# Patient Record
Sex: MALE | Race: WHITE | NOT HISPANIC OR LATINO | Employment: OTHER | URBAN - METROPOLITAN AREA
[De-identification: names, ages, dates, MRNs, and addresses within clinical notes are randomized per-mention and may not be internally consistent; named-entity substitution may affect disease eponyms.]

---

## 2017-01-16 ENCOUNTER — TREATMENT (OUTPATIENT)
Dept: PHYSICAL THERAPY | Facility: CLINIC | Age: 72
End: 2017-01-16

## 2017-01-16 DIAGNOSIS — M54.41 ACUTE RIGHT-SIDED LOW BACK PAIN WITH RIGHT-SIDED SCIATICA: Primary | ICD-10-CM

## 2017-01-16 PROCEDURE — 97012 MECHANICAL TRACTION THERAPY: CPT | Performed by: PHYSICAL THERAPIST

## 2017-01-16 NOTE — PROGRESS NOTES
Daily Progress Note    Time In: 11:00     Time Out: 11:27    Subjective   Did really good on the drive to [place] - stopped every 2 hours - played golf twice without any increase in s/s        Objective   General  Observation: no tenderness right low back     AROM:                                    , , , ,   , ,       , , , ,   , , ,      , ,Lumbar Spine Range of Motion  Lumbar Spine Flexion: 100  Lumbar Spine Extension: 100  Lumbar Spine Sidebend Left: 100  Lumbar Spine Sidebend Right: 100  Lumbar Spine Rotation Left: 100  Lumbar Spine Rotation Right: 100,   , , , ,   , ,      , , ,   , , , ,        , , ,   , , , ,   ,      , , , ,   ,      , , , ,        , , , ,         PROCEDURES AND MODALITIES:                                Traction 25820  Traction Type: Lumbar  Rx Minutes: 15  Position: Hook-lying  Weight: 100  Hold: 45  Relax: 15  Progression: 1  Regression: 1    EXERCISE                  HEP reviewed                                          MANUAL PT:                               Total Treatment Time: 20 Minutes    Assessment/Plan   Improving subjective reports, ROM and tenderness. Attempted traction only today - patient to continue with HEP - reviewed progressions. 3rd epidural scheduled for [facility]. Will call when returns from [place] in March if further PT warranted.   Progress strengthening /stabilization /functional activity             Dianna Joiner, PT  Physical Therapist

## 2017-01-17 ENCOUNTER — HOSPITAL ENCOUNTER (OUTPATIENT)
Dept: PAIN MEDICINE | Facility: HOSPITAL | Age: 72
Discharge: HOME | End: 2017-01-17

## 2017-05-01 ENCOUNTER — OFFICE VISIT (OUTPATIENT)
Dept: CARDIOLOGY | Facility: CLINIC | Age: 72
End: 2017-05-01

## 2017-05-01 VITALS
SYSTOLIC BLOOD PRESSURE: 138 MMHG | DIASTOLIC BLOOD PRESSURE: 74 MMHG | BODY MASS INDEX: 26.43 KG/M2 | HEART RATE: 76 BPM | WEIGHT: 184.6 LBS | HEIGHT: 70 IN

## 2017-05-01 DIAGNOSIS — I25.84 CORONARY ARTERY CALCIFICATION: ICD-10-CM

## 2017-05-01 DIAGNOSIS — R53.83 FATIGUE, UNSPECIFIED TYPE: ICD-10-CM

## 2017-05-01 DIAGNOSIS — R06.09 DYSPNEA ON EXERTION: Primary | ICD-10-CM

## 2017-05-01 DIAGNOSIS — I71.40 ABDOMINAL AORTIC ANEURYSM (AAA) WITHOUT RUPTURE (HCC): ICD-10-CM

## 2017-05-01 DIAGNOSIS — Z72.0 TOBACCO USE: ICD-10-CM

## 2017-05-01 DIAGNOSIS — I25.10 CORONARY ARTERY CALCIFICATION: ICD-10-CM

## 2017-05-01 PROCEDURE — 99214 OFFICE O/P EST MOD 30 MIN: CPT | Performed by: INTERNAL MEDICINE

## 2017-05-01 PROCEDURE — 93000 ELECTROCARDIOGRAM COMPLETE: CPT | Performed by: INTERNAL MEDICINE

## 2017-05-10 ENCOUNTER — HOSPITAL ENCOUNTER (OUTPATIENT)
Dept: CARDIOLOGY | Facility: HOSPITAL | Age: 72
Discharge: HOME | End: 2017-05-10
Attending: INTERNAL MEDICINE | Admitting: INTERNAL MEDICINE

## 2017-05-10 ENCOUNTER — HOSPITAL ENCOUNTER (OUTPATIENT)
Dept: CARDIOLOGY | Facility: HOSPITAL | Age: 72
Discharge: HOME | End: 2017-05-10
Attending: INTERNAL MEDICINE

## 2017-05-10 VITALS
BODY MASS INDEX: 26.05 KG/M2 | SYSTOLIC BLOOD PRESSURE: 134 MMHG | DIASTOLIC BLOOD PRESSURE: 70 MMHG | HEIGHT: 70 IN | HEART RATE: 73 BPM | WEIGHT: 182 LBS

## 2017-05-10 DIAGNOSIS — R06.02 SHORTNESS OF BREATH: ICD-10-CM

## 2017-05-10 DIAGNOSIS — I25.10 CORONARY ARTERY CALCIFICATION: ICD-10-CM

## 2017-05-10 DIAGNOSIS — I71.40 ABDOMINAL AORTIC ANEURYSM (AAA) WITHOUT RUPTURE (HCC): ICD-10-CM

## 2017-05-10 DIAGNOSIS — I25.84 CORONARY ARTERY CALCIFICATION: ICD-10-CM

## 2017-05-10 DIAGNOSIS — R06.09 DYSPNEA ON EXERTION: ICD-10-CM

## 2017-05-10 DIAGNOSIS — I25.10 CORONARY ARTERY CALCIFICATION SEEN ON CAT SCAN: Primary | ICD-10-CM

## 2017-05-10 LAB
ABDOMINAL DIST AORTA AP: 2.6 CM
ABDOMINAL DIST AORTA TRANS: 2.6 CM
ABDOMINAL MID AORTA AP: 3.2 CM
ABDOMINAL MID AORTA TRANS: 3.4 CM
ABDOMINAL PROX AORTA AP: 1.6 CM
ABDOMINAL PROX AORTA TRANS: 1.9 CM
ASCENDING AORTA: 3.8 CM
BH CV ECHO MEAS - ACS: 2.1 CM
BH CV ECHO MEAS - AO MAX PG: 9.1 MMHG
BH CV ECHO MEAS - AO ROOT AREA (BSA CORRECTED): 2
BH CV ECHO MEAS - AO ROOT AREA: 12.5 CM^2
BH CV ECHO MEAS - AO ROOT DIAM: 4 CM
BH CV ECHO MEAS - AO V2 MAX: 150.7 CM/SEC
BH CV ECHO MEAS - BSA(HAYCOCK): 2 M^2
BH CV ECHO MEAS - BSA: 2 M^2
BH CV ECHO MEAS - BZI_BMI: 26.1 KILOGRAMS/M^2
BH CV ECHO MEAS - BZI_METRIC_HEIGHT: 177.8 CM
BH CV ECHO MEAS - BZI_METRIC_WEIGHT: 82.6 KG
BH CV ECHO MEAS - CONTRAST EF 4CH: 59.6 ML/M^2
BH CV ECHO MEAS - EDV(CUBED): 107.9 ML
BH CV ECHO MEAS - EDV(MOD-SP4): 47 ML
BH CV ECHO MEAS - EDV(TEICH): 105.5 ML
BH CV ECHO MEAS - EF(CUBED): 75.7 %
BH CV ECHO MEAS - EF(MOD-SP4): 59.6 %
BH CV ECHO MEAS - EF(TEICH): 67.6 %
BH CV ECHO MEAS - ESV(CUBED): 26.2 ML
BH CV ECHO MEAS - ESV(MOD-SP4): 19 ML
BH CV ECHO MEAS - ESV(TEICH): 34.2 ML
BH CV ECHO MEAS - FS: 37.6 %
BH CV ECHO MEAS - IVS/LVPW: 0.94
BH CV ECHO MEAS - IVSD: 1.1 CM
BH CV ECHO MEAS - LAT PEAK E' VEL: 10 CM/SEC
BH CV ECHO MEAS - LV DIASTOLIC VOL/BSA (35-75): 23.4 ML/M^2
BH CV ECHO MEAS - LV MASS(C)D: 211.5 GRAMS
BH CV ECHO MEAS - LV MASS(C)DI: 105.5 GRAMS/M^2
BH CV ECHO MEAS - LV SYSTOLIC VOL/BSA (12-30): 9.5 ML/M^2
BH CV ECHO MEAS - LVIDD: 4.8 CM
BH CV ECHO MEAS - LVIDS: 3 CM
BH CV ECHO MEAS - LVLD AP4: 6.8 CM
BH CV ECHO MEAS - LVLS AP4: 6.2 CM
BH CV ECHO MEAS - LVPWD: 1.2 CM
BH CV ECHO MEAS - MED PEAK E' VEL: 8 CM/SEC
BH CV ECHO MEAS - MV A DUR: 0.11 SEC
BH CV ECHO MEAS - MV A MAX VEL: 71.8 CM/SEC
BH CV ECHO MEAS - MV DEC SLOPE: 176.6 CM/SEC^2
BH CV ECHO MEAS - MV DEC TIME: 0.27 SEC
BH CV ECHO MEAS - MV E MAX VEL: 49 CM/SEC
BH CV ECHO MEAS - MV E/A: 0.68
BH CV ECHO MEAS - MV P1/2T MAX VEL: 49 CM/SEC
BH CV ECHO MEAS - MV P1/2T: 81.2 MSEC
BH CV ECHO MEAS - MVA P1/2T LCG: 4.5 CM^2
BH CV ECHO MEAS - MVA(P1/2T): 2.7 CM^2
BH CV ECHO MEAS - PULM A REVS DUR: 0.11 SEC
BH CV ECHO MEAS - PULM A REVS VEL: 29.7 CM/SEC
BH CV ECHO MEAS - PULM DIAS VEL: 39.4 CM/SEC
BH CV ECHO MEAS - PULM S/D: 1.3
BH CV ECHO MEAS - PULM SYS VEL: 50.5 CM/SEC
BH CV ECHO MEAS - SI(CUBED): 40.7 ML/M^2
BH CV ECHO MEAS - SI(MOD-SP4): 14 ML/M^2
BH CV ECHO MEAS - SI(TEICH): 35.6 ML/M^2
BH CV ECHO MEAS - SV(CUBED): 81.7 ML
BH CV ECHO MEAS - SV(MOD-SP4): 28 ML
BH CV ECHO MEAS - SV(TEICH): 71.3 ML
BH CV STRESS BP STAGE 1: NORMAL
BH CV STRESS BP STAGE 2: NORMAL
BH CV STRESS DURATION MIN STAGE 1: 3
BH CV STRESS DURATION MIN STAGE 2: 1
BH CV STRESS DURATION SEC STAGE 1: 0
BH CV STRESS DURATION SEC STAGE 2: 26
BH CV STRESS GRADE STAGE 1: 10
BH CV STRESS GRADE STAGE 2: 12
BH CV STRESS HR STAGE 1: 102
BH CV STRESS HR STAGE 2: 121
BH CV STRESS METS STAGE 1: 5
BH CV STRESS METS STAGE 2: 7.5
BH CV STRESS O2 STAGE 2: 87
BH CV STRESS PROTOCOL 1: NORMAL
BH CV STRESS SPEED STAGE 1: 1.7
BH CV STRESS SPEED STAGE 2: 2.5
BH CV STRESS STAGE 1: 1
BH CV STRESS STAGE 2: 2
BH CV VAS SMA 1ST PP TIME: 15 MIN
BH CV VAS SMA 2ND PP TIME: 30 MIN
BH CV VAS SMA 3RD PP TIME: 45 MIN
E/E' RATIO: 6
LEFT ATRIUM VOLUME INDEX: 17 ML/M2
MAXIMAL PREDICTED HEART RATE: 150 BPM
PERCENT MAX PREDICTED HR: 80.67 %
SINUS: 3.8 CM
STJ: 3.5 CM
STRESS BASELINE BP: NORMAL MMHG
STRESS BASELINE HR: 73 BPM
STRESS O2 SAT REST: 97 %
STRESS PERCENT HR: 95 %
STRESS POST ESTIMATED WORKLOAD: 6 METS
STRESS POST EXERCISE DUR MIN: 6 MIN
STRESS POST EXERCISE DUR SEC: 26 SEC
STRESS POST O2 SAT PEAK: 87 %
STRESS POST PEAK BP: NORMAL MMHG
STRESS POST PEAK HR: 121 BPM
STRESS TARGET HR: 128 BPM

## 2017-05-10 PROCEDURE — 93325 DOPPLER ECHO COLOR FLOW MAPG: CPT | Performed by: INTERNAL MEDICINE

## 2017-05-10 PROCEDURE — 93320 DOPPLER ECHO COMPLETE: CPT | Performed by: INTERNAL MEDICINE

## 2017-05-10 PROCEDURE — 93320 DOPPLER ECHO COMPLETE: CPT

## 2017-05-10 PROCEDURE — 93979 VASCULAR STUDY: CPT

## 2017-05-10 PROCEDURE — 93017 CV STRESS TEST TRACING ONLY: CPT

## 2017-05-10 PROCEDURE — 93352 ADMIN ECG CONTRAST AGENT: CPT | Performed by: INTERNAL MEDICINE

## 2017-05-10 PROCEDURE — 93016 CV STRESS TEST SUPVJ ONLY: CPT | Performed by: INTERNAL MEDICINE

## 2017-05-10 PROCEDURE — 93979 VASCULAR STUDY: CPT | Performed by: INTERNAL MEDICINE

## 2017-05-10 PROCEDURE — 93325 DOPPLER ECHO COLOR FLOW MAPG: CPT

## 2017-05-10 PROCEDURE — C8928 TTE W OR W/O FOL W/CON,STRES: HCPCS

## 2017-05-10 PROCEDURE — 93018 CV STRESS TEST I&R ONLY: CPT | Performed by: INTERNAL MEDICINE

## 2017-05-10 PROCEDURE — 25010000002 PERFLUTREN (DEFINITY) 8.476 MG IN SODIUM CHLORIDE 10 ML INJECTION: Performed by: INTERNAL MEDICINE

## 2017-05-10 PROCEDURE — 93350 STRESS TTE ONLY: CPT | Performed by: INTERNAL MEDICINE

## 2017-05-10 RX ADMIN — PERFLUTREN 3 ML: 6.52 INJECTION, SUSPENSION INTRAVENOUS at 13:43

## 2017-05-26 ENCOUNTER — HOSPITAL ENCOUNTER (OUTPATIENT)
Dept: CARDIOLOGY | Facility: HOSPITAL | Age: 72
Discharge: HOME | End: 2017-05-26
Attending: INTERNAL MEDICINE | Admitting: INTERNAL MEDICINE

## 2017-05-26 DIAGNOSIS — R06.02 SHORTNESS OF BREATH: ICD-10-CM

## 2017-05-26 DIAGNOSIS — I25.10 CORONARY ARTERY CALCIFICATION SEEN ON CAT SCAN: ICD-10-CM

## 2017-05-26 LAB
BH CV NUCLEAR PRIOR STUDY: 3
BH CV STRESS BP STAGE 1: NORMAL
BH CV STRESS COMMENTS STAGE 1: NORMAL
BH CV STRESS DOSE REGADENOSON STAGE 1: 0.4
BH CV STRESS DURATION MIN STAGE 1: 0
BH CV STRESS DURATION SEC STAGE 1: 15
BH CV STRESS HR STAGE 1: 117
BH CV STRESS PROTOCOL 1: NORMAL
BH CV STRESS RECOVERY BP: NORMAL MMHG
BH CV STRESS RECOVERY HR: 92 BPM
BH CV STRESS STAGE 1: 1
LV EF NUC BP: 61 %
MAXIMAL PREDICTED HEART RATE: 150 BPM
PERCENT MAX PREDICTED HR: 78 %
STRESS BASELINE BP: NORMAL MMHG
STRESS BASELINE HR: 75 BPM
STRESS PERCENT HR: 92 %
STRESS POST EXERCISE DUR SEC: 15 SEC
STRESS POST PEAK BP: NORMAL MMHG
STRESS POST PEAK HR: 117 BPM
STRESS TARGET HR: 128 BPM

## 2017-05-26 PROCEDURE — 25010000002 REGADENOSON 0.4 MG/5ML SOLUTION: Performed by: INTERNAL MEDICINE

## 2017-05-26 PROCEDURE — 93017 CV STRESS TEST TRACING ONLY: CPT

## 2017-05-26 PROCEDURE — 93018 CV STRESS TEST I&R ONLY: CPT | Performed by: INTERNAL MEDICINE

## 2017-05-26 PROCEDURE — 78452 HT MUSCLE IMAGE SPECT MULT: CPT

## 2017-05-26 PROCEDURE — A9502 TC99M TETROFOSMIN: HCPCS | Performed by: INTERNAL MEDICINE

## 2017-05-26 PROCEDURE — 78452 HT MUSCLE IMAGE SPECT MULT: CPT | Performed by: INTERNAL MEDICINE

## 2017-05-26 PROCEDURE — 0 TECHNETIUM TETROFOSMIN KIT: Performed by: INTERNAL MEDICINE

## 2017-05-26 PROCEDURE — 93016 CV STRESS TEST SUPVJ ONLY: CPT | Performed by: INTERNAL MEDICINE

## 2017-05-26 RX ADMIN — REGADENOSON 0.4 MG: 0.08 INJECTION, SOLUTION INTRAVENOUS at 12:25

## 2017-05-26 RX ADMIN — TETROFOSMIN 1 DOSE: 1.38 INJECTION, POWDER, LYOPHILIZED, FOR SOLUTION INTRAVENOUS at 12:25

## 2017-05-26 RX ADMIN — TETROFOSMIN 1 DOSE: 1.38 INJECTION, POWDER, LYOPHILIZED, FOR SOLUTION INTRAVENOUS at 11:35

## 2017-08-14 ENCOUNTER — TREATMENT (OUTPATIENT)
Dept: PHYSICAL THERAPY | Facility: CLINIC | Age: 72
End: 2017-08-14

## 2017-08-14 DIAGNOSIS — M54.41 ACUTE RIGHT-SIDED LOW BACK PAIN WITH RIGHT-SIDED SCIATICA: Primary | ICD-10-CM

## 2017-08-14 PROCEDURE — DRYNDL PR CUSTOM DRY NEEDLING SELF PAY: Performed by: PHYSICAL THERAPIST

## 2017-08-14 NOTE — PROGRESS NOTES
Physical Therapy Daily Progress Note      Visit # : 19  Raoul Moura reports: Played golf a week ago Monday and started having back pain again - was unable to play golf Friday - better today and yesterday.     Subjective     Objective     Palpation   Left   Tenderness of the lumbar interspinals and lumbar paraspinals.     Right   Hypertonic in the lumbar interspinals, lumbar paraspinals and quadratus lumborum.     Tenderness     Right Hip   Tenderness in the PSIS.      See Exercise, Manual, and Modality Logs for complete treatment.       Assessment & Plan     Assessment  Assessment details: Patient presents with flare-up in low back s/s after golfing one week ago. Increased muscle tone right PVM's, decreased trunk mobility, and decreased tolerance to prolonged positioning. Attempted lumbar traction and Dry Needling today. Patient is still compliant with HEP and will continue to be. Patient leaving town and will call to schedule if further treatment is indicated.         Progress per Plan of Care           Manual Therapy:         mins  40190;  Therapeutic Exercise:         mins  26762;     Neuromuscular Rosanne:        mins  25469;    Therapeutic Activity:          mins  63324;     Gait Training:           mins  82221;     Ultrasound:      8    mins  79629;    Electrical Stimulation:         mins  97465 ( );  Dry Needling     15     mins self-pay    Timed Treatment:   8   mins   Total Treatment:     35   mins    Dianna Joiner PT  Physical Therapist  [place] License # [id number]

## 2017-10-02 ENCOUNTER — TREATMENT (OUTPATIENT)
Dept: PHYSICAL THERAPY | Facility: CLINIC | Age: 72
End: 2017-10-02

## 2017-10-02 DIAGNOSIS — M54.41 CHRONIC RIGHT-SIDED LOW BACK PAIN WITH RIGHT-SIDED SCIATICA: Primary | ICD-10-CM

## 2017-10-02 DIAGNOSIS — G89.29 CHRONIC RIGHT-SIDED LOW BACK PAIN WITH RIGHT-SIDED SCIATICA: Primary | ICD-10-CM

## 2017-10-02 PROCEDURE — DRYNDL PR CUSTOM DRY NEEDLING SELF PAY: Performed by: PHYSICAL THERAPIST

## 2017-10-02 PROCEDURE — 97012 MECHANICAL TRACTION THERAPY: CPT | Performed by: PHYSICAL THERAPIST

## 2017-10-02 NOTE — PROGRESS NOTES
Physical Therapy Daily Progress Note        Visit # : 20  Raoul Moura reports: Was doing the hip abd/add machine at the gym really pulled the inside of my thigh then yesterday went to go play golf and had back and leg pain with every step     Subjective     Objective     Palpation   Left   Hypertonic in the iliopsoas and lumbar paraspinals.   Tenderness of the erector spinae and quadratus lumborum.     Right   Hypertonic in the iliopsoas and lumbar paraspinals. Tenderness of the erector spinae and quadratus lumborum.      See Exercise, Manual, and Modality Logs for complete treatment.       Assessment & Plan     Assessment  Assessment details: Patient presents with flare-up in low back s/s after golfing and performing hip machine at gym. Increased muscle tone right PVM's, decreased trunk mobility, and decreased tolerance to prolonged positioning. Attempted lumbar traction and Dry Needling today. Patient is still compliant with HEP and will continue to be. Patient leaving town and will call to schedule if further treatment is indicated.         Progress strengthening /stabilization /functional activity           Manual Therapy:         mins  38155;  Therapeutic Exercise:         mins  20122;     Neuromuscular Rosanne:        mins  62713;    Therapeutic Activity:         mins  27497;     Gait Training:           mins  24282;     Ultrasound:          mins  67925;    Electrical Stimulation:         mins  03331 ( );  Dry Needling     15     mins self-pay       Total Treatment:     28   mins    Dianna Batres PT  Physical Therapist  [place] License # [id number]

## 2017-10-04 ENCOUNTER — TREATMENT (OUTPATIENT)
Dept: PHYSICAL THERAPY | Facility: CLINIC | Age: 72
End: 2017-10-04

## 2017-10-04 DIAGNOSIS — M54.41 CHRONIC RIGHT-SIDED LOW BACK PAIN WITH RIGHT-SIDED SCIATICA: Primary | ICD-10-CM

## 2017-10-04 DIAGNOSIS — G89.29 CHRONIC RIGHT-SIDED LOW BACK PAIN WITH RIGHT-SIDED SCIATICA: Primary | ICD-10-CM

## 2017-10-04 PROCEDURE — 97012 MECHANICAL TRACTION THERAPY: CPT | Performed by: PHYSICAL THERAPIST

## 2017-10-04 PROCEDURE — 97035 APP MDLTY 1+ULTRASOUND EA 15: CPT | Performed by: PHYSICAL THERAPIST

## 2017-10-04 PROCEDURE — DRYNDL PR CUSTOM DRY NEEDLING SELF PAY: Performed by: PHYSICAL THERAPIST

## 2017-10-04 NOTE — PROGRESS NOTES
Physical Therapy Daily Progress Note        Visit # : 21  Raoul Moura reports: I am still having pain when I put weight through my right leg - I cancelled playing golf this weekend    Subjective     Objective     Palpation     Right   Hypertonic in the iliopsoas, lumbar interspinals, lumbar paraspinals and quadratus lumborum.     Tenderness     Right Hip   Tenderness in the PSIS.     Ambulation     Observational Gait   Gait: antalgic   Decreased walking speed, stride length and right stance time.      See Exercise, Manual, and Modality Logs for complete treatment.       Assessment & Plan     Assessment  Assessment details: Patient presents with antalgic gait, tender right PSIS and lumbar PVM's. Attempted Dry Needling followed by US for inflammation and traction for reduction of radicular s/s. Instructed patient to continue lumbar extension activity as tolerated.         Progress per Plan of Care           Manual Therapy:         mins  03604;  Therapeutic Exercise:         mins  51924;     Neuromuscular Rosanne:        mins  11648;    Therapeutic Activity:          mins  35565;     Gait Training:           mins  90128;     Ultrasound:     8     mins  11608;    Electrical Stimulation:         mins  65678 ( );  Dry Needling     15     mins self-pay    Timed Treatment:   8   mins   Total Treatment:     35   mins    Dianna Batres, PT  Physical Therapist  [place] License # [id number]

## 2017-10-09 ENCOUNTER — TREATMENT (OUTPATIENT)
Dept: PHYSICAL THERAPY | Facility: CLINIC | Age: 72
End: 2017-10-09

## 2017-10-09 DIAGNOSIS — G89.29 CHRONIC RIGHT-SIDED LOW BACK PAIN WITH RIGHT-SIDED SCIATICA: Primary | ICD-10-CM

## 2017-10-09 DIAGNOSIS — M54.41 CHRONIC RIGHT-SIDED LOW BACK PAIN WITH RIGHT-SIDED SCIATICA: Primary | ICD-10-CM

## 2017-10-09 PROCEDURE — DRYNDL PR CUSTOM DRY NEEDLING SELF PAY: Performed by: PHYSICAL THERAPIST

## 2017-10-09 PROCEDURE — 97012 MECHANICAL TRACTION THERAPY: CPT | Performed by: PHYSICAL THERAPIST

## 2017-10-09 PROCEDURE — 97035 APP MDLTY 1+ULTRASOUND EA 15: CPT | Performed by: PHYSICAL THERAPIST

## 2017-10-09 NOTE — PROGRESS NOTES
Physical Therapy Daily Progress Note        Visit # : 22  Raoul Moura reports: I had some discomfort Friday morning went and rode the golf cart and had increased pain Friday night - pain with every step I took    Subjective     Objective     Palpation     Right   Hypertonic in the erector spinae, lumbar interspinals, lumbar paraspinals and quadratus lumborum.      See Exercise, Manual, and Modality Logs for complete treatment.       Assessment & Plan     Assessment  Assessment details: Increased subjective reports antalgic gait, lumbar shift left, increased muscle tone right PSIS and lumbar PVM's. Attempted Dry Needling followed by US for inflammation and traction for reduction of radicular s/s. Instructed patient to continue lumbar extension activity as tolerated.         Progress strengthening /stabilization /functional activity           Manual Therapy:         mins  67493;  Therapeutic Exercise:         mins  61456;     Neuromuscular Rosanne:        mins  36930;    Therapeutic Activity:          mins  85039;     Gait Training:           mins  65691;     Ultrasound:     8     mins  92906;    Electrical Stimulation:         mins  11997 ( );  Dry Needling     15     mins self-pay    Timed Treatment:   8   mins   Total Treatment:     40   mins    Dianna Batres PT  Physical Therapist  [place] License # [id number]

## 2017-10-11 ENCOUNTER — TREATMENT (OUTPATIENT)
Dept: PHYSICAL THERAPY | Facility: CLINIC | Age: 72
End: 2017-10-11

## 2017-10-11 DIAGNOSIS — G89.29 CHRONIC RIGHT-SIDED LOW BACK PAIN WITH RIGHT-SIDED SCIATICA: Primary | ICD-10-CM

## 2017-10-11 DIAGNOSIS — M54.41 CHRONIC RIGHT-SIDED LOW BACK PAIN WITH RIGHT-SIDED SCIATICA: Primary | ICD-10-CM

## 2017-10-11 PROCEDURE — 97035 APP MDLTY 1+ULTRASOUND EA 15: CPT | Performed by: PHYSICAL THERAPIST

## 2017-10-11 PROCEDURE — DRYNDL PR CUSTOM DRY NEEDLING SELF PAY: Performed by: PHYSICAL THERAPIST

## 2017-10-11 PROCEDURE — 97012 MECHANICAL TRACTION THERAPY: CPT | Performed by: PHYSICAL THERAPIST

## 2017-10-11 NOTE — PROGRESS NOTES
Physical Therapy Daily Progress Note        Visit # : 23  Raoul Moura reports: I went to the chiropractor yesterday and he said I was really out of alignment     Subjective     Objective       Palpation     Right   Hypertonic in the erector spinae, iliopsoas, lumbar interspinals, lumbar paraspinals and quadratus lumborum.      See Exercise, Manual, and Modality Logs for complete treatment.       Assessment & Plan     Assessment  Assessment details: Minimal improvement noted in gait mechanics- decreased right LE stance time persists. Increased subjective reports antalgic gait, lumbar shift left, increased muscle tone right PSIS and lumbar PVM's. Attempted Dry Needling followed by US for inflammation and traction for reduction of radicular s/s. Instructed patient to continue lumbar extension activity as tolerated.         Progress strengthening /stabilization /functional activity           Manual Therapy:         mins  14094;  Therapeutic Exercise:         mins  22571;     Neuromuscular Rosanne:        mins  82033;    Therapeutic Activity:          mins  95623;     Gait Training:           mins  64447;     Ultrasound:     8     mins  44366;    Electrical Stimulation:         mins  45198 ( );  Dry Needling     15     mins self-pay    Timed Treatment:   8   mins   Total Treatment:     40   mins    Dianna Batres PT  Physical Therapist  [place] License # [id number]

## 2017-10-16 ENCOUNTER — TREATMENT (OUTPATIENT)
Dept: PHYSICAL THERAPY | Facility: CLINIC | Age: 72
End: 2017-10-16

## 2017-10-16 DIAGNOSIS — M54.41 CHRONIC RIGHT-SIDED LOW BACK PAIN WITH RIGHT-SIDED SCIATICA: Primary | ICD-10-CM

## 2017-10-16 DIAGNOSIS — G89.29 CHRONIC RIGHT-SIDED LOW BACK PAIN WITH RIGHT-SIDED SCIATICA: Primary | ICD-10-CM

## 2017-10-16 PROCEDURE — 97035 APP MDLTY 1+ULTRASOUND EA 15: CPT | Performed by: PHYSICAL THERAPIST

## 2017-10-16 PROCEDURE — DRYNDL PR CUSTOM DRY NEEDLING SELF PAY: Performed by: PHYSICAL THERAPIST

## 2017-10-16 PROCEDURE — 97012 MECHANICAL TRACTION THERAPY: CPT | Performed by: PHYSICAL THERAPIST

## 2017-10-16 NOTE — PROGRESS NOTES
Physical Therapy Daily Progress Note        Visit # : 24  Raoul Moura reports: I called to get an Epidural - the pain is better but not 100% yet    Subjective     Objective       Palpation   Left   Hypertonic in the erector spinae, lumbar interspinals, lumbar paraspinals and quadratus lumborum.     Right   Hypertonic in the erector spinae, lumbar interspinals, lumbar paraspinals and quadratus lumborum.     Ambulation     Observational Gait   Gait: antalgic   Decreased right stance time and right step length.      See Exercise, Manual, and Modality Logs for complete treatment.       Assessment & Plan     Assessment  Assessment details: Some improvement noted in gait mechanics- decreased right LE stance time persists however stride length is improved.  Lumbar shift left, increased muscle tone right PSIS and lumbar PVM's persists. Attempted Dry Needling followed by US for inflammation and traction for reduction of radicular s/s. Instructed patient to continue lumbar extension activity as tolerated.         Progress strengthening /stabilization /functional activity           Manual Therapy:         mins  13320;  Therapeutic Exercise:         mins  86382;     Neuromuscular Rosanne:        mins  19532;    Therapeutic Activity:          mins  02616;     Gait Training:           mins  96249;     Ultrasound:     8     mins  61621;    Electrical Stimulation:         mins  43898 ( );  Dry Needling     15     mins self-pay    Timed Treatment:   8   mins   Total Treatment:     40   mins    Dianna Batres PT  Physical Therapist  [place] License # [id number]

## 2017-10-19 ENCOUNTER — TREATMENT (OUTPATIENT)
Dept: PHYSICAL THERAPY | Facility: CLINIC | Age: 72
End: 2017-10-19

## 2017-10-19 DIAGNOSIS — G89.29 CHRONIC RIGHT-SIDED LOW BACK PAIN WITH RIGHT-SIDED SCIATICA: Primary | ICD-10-CM

## 2017-10-19 DIAGNOSIS — M54.41 CHRONIC RIGHT-SIDED LOW BACK PAIN WITH RIGHT-SIDED SCIATICA: Primary | ICD-10-CM

## 2017-10-19 PROCEDURE — DRYNDL PR CUSTOM DRY NEEDLING SELF PAY: Performed by: PHYSICAL THERAPIST

## 2017-10-19 PROCEDURE — 97012 MECHANICAL TRACTION THERAPY: CPT | Performed by: PHYSICAL THERAPIST

## 2017-10-19 PROCEDURE — 97110 THERAPEUTIC EXERCISES: CPT | Performed by: PHYSICAL THERAPIST

## 2017-10-19 NOTE — PROGRESS NOTES
Physical Therapy Daily Progress Note        Visit # : 25  Raoul Moura reports: I have to wait to see the Doctor in November before I can get the epidural     Subjective     Objective       Palpation     Right   Hypertonic in the iliopsoas, lumbar interspinals, lumbar paraspinals and quadratus lumborum.      See Exercise, Manual, and Modality Logs for complete treatment.       Assessment & Plan     Assessment  Assessment details: Some improvement noted in gait mechanics- decreased right LE stance time persists however stride length is improved.  Lumbar shift left, increased muscle tone right PSIS and lumbar PVM's persists. Attempted Dry Needling followed by US for inflammation and traction for reduction of radicular s/s. Instructed patient to continue lumbar extension activity as tolerated.         Progress strengthening /stabilization /functional activity           Manual Therapy:         mins  13513;  Therapeutic Exercise:    8     mins  35795;     Neuromuscular Rosanne:        mins  78162;    Therapeutic Activity:          mins  83664;     Gait Training:           mins  00990;     Ultrasound:     8     mins  71231;    Electrical Stimulation:         mins  10804 ( );  Dry Needling          mins self-pay    Timed Treatment:   16   mins   Total Treatment:     46   mins    Dianna Batres PT  Physical Therapist  [place] License # [id number]

## 2017-10-24 ENCOUNTER — TREATMENT (OUTPATIENT)
Dept: PHYSICAL THERAPY | Facility: CLINIC | Age: 72
End: 2017-10-24

## 2017-10-24 DIAGNOSIS — G89.29 CHRONIC RIGHT-SIDED LOW BACK PAIN WITH RIGHT-SIDED SCIATICA: Primary | ICD-10-CM

## 2017-10-24 DIAGNOSIS — M54.41 CHRONIC RIGHT-SIDED LOW BACK PAIN WITH RIGHT-SIDED SCIATICA: Primary | ICD-10-CM

## 2017-10-24 PROCEDURE — DRYNDL PR CUSTOM DRY NEEDLING SELF PAY: Performed by: PHYSICAL THERAPIST

## 2017-10-24 PROCEDURE — 97110 THERAPEUTIC EXERCISES: CPT | Performed by: PHYSICAL THERAPIST

## 2017-10-24 PROCEDURE — 97012 MECHANICAL TRACTION THERAPY: CPT | Performed by: PHYSICAL THERAPIST

## 2017-10-24 NOTE — PROGRESS NOTES
Physical Therapy Daily Progress Note        Visit # : 26  Raoul Moura reports: I had to go to a wedding event Saturday and by the time I got to the door I could barely walk - soaked in Epson salts and took muscle relaxer's better Sunday and Monday - have appointment with Neurosurgemary kate zazueta     Subjective     Objective       Palpation     Right   Hypertonic in the iliopsoas, lumbar paraspinals and quadratus lumborum.     Tenderness     Right Hip   Tenderness in the PSIS.      See Exercise, Manual, and Modality Logs for complete treatment.       Assessment & Plan     Assessment  Assessment details: Some improvement noted in gait mechanics- decreased right LE stance time persists however stride length is improved.  Lumbar shift left, increased muscle tone right PSIS and lumbar PVM's persists. Attempted Dry Needling followed by US for inflammation and traction for reduction of radicular s/s. Patient to Neurosurgeon carlita will call with plan.         Progress strengthening /stabilization /functional activity           Manual Therapy:         mins  21634;  Therapeutic Exercise:    8    mins  21443;     Neuromuscular Rosanne:        mins  59423;    Therapeutic Activity:          mins  09296;     Gait Training:           mins  42796;     Ultrasound:     8     mins  05556;    Electrical Stimulation:         mins  87358 ( );  Dry Needling     15     mins self-pay    Timed Treatment:   16   mins   Total Treatment:     46   mins    Dianna Batres PT  Physical Therapist  [place] License # [id number]

## 2017-10-25 ENCOUNTER — OFFICE VISIT (OUTPATIENT)
Dept: NEUROSURGERY | Facility: CLINIC | Age: 72
End: 2017-10-25

## 2017-10-25 VITALS — SYSTOLIC BLOOD PRESSURE: 133 MMHG | DIASTOLIC BLOOD PRESSURE: 86 MMHG | HEART RATE: 85 BPM

## 2017-10-25 DIAGNOSIS — M54.16 LUMBAR RADICULOPATHY: Primary | ICD-10-CM

## 2017-10-25 PROCEDURE — 99213 OFFICE O/P EST LOW 20 MIN: CPT | Performed by: NEUROLOGICAL SURGERY

## 2017-10-25 RX ORDER — LANOLIN ALCOHOL/MO/W.PET/CERES
1000 CREAM (GRAM) TOPICAL DAILY
COMMUNITY

## 2017-10-25 NOTE — PROGRESS NOTES
Subjective   Patient ID: Raoul Moura is a 72 y.o. male is here today for follow-up on back pain that radiates into right leg. He has had physical therapy recently with Dry Needling and traction.    Back Pain   The current episode started 1 to 4 weeks ago. The problem occurs daily. The problem has been gradually improving since onset. The pain is present in the lumbar spine. The quality of the pain is described as aching. The pain radiates to the right thigh. The pain is at a severity of 5/10. The pain is worse during the day. The symptoms are aggravated by bending, position, standing and twisting. Stiffness is present in the morning. Associated symptoms include leg pain. Pertinent negatives include no chest pain or numbness.     This patient had been lifting some weights and had the acute onset of pain primarily in his right thigh.  The pain is sharp and stabbing and quite severe.  It is located in the right side of his lower back and radiates into his right anterior thigh.  Initially had a little trouble on the left side as well but that has gotten better.  He has no difficulty with bowel or bladder control or other associated symptoms.  He has been treated with exercise and physical therapy but it is not helping right now.  He had one epidural block last year and this helped a lot so he did not have any more.    The following portions of the patient's history were reviewed and updated as appropriate: allergies, current medications, past family history, past medical history, past social history, past surgical history and problem list.    Review of Systems   Constitutional: Positive for activity change.   Respiratory: Negative for chest tightness and shortness of breath.    Cardiovascular: Negative for chest pain.   Musculoskeletal: Positive for back pain and gait problem.        Right leg pain   Neurological: Negative for numbness.   All other systems reviewed and are negative.      Objective   Physical Exam    Constitutional: He is oriented to person, place, and time. He appears well-developed and well-nourished.   Eyes: EOM are normal. Pupils are equal, round, and reactive to light.   Neurological: He is oriented to person, place, and time. He has a normal Finger-Nose-Finger Test and a normal Heel to Shin Test. Gait normal.   Reflex Scores:       Tricep reflexes are 2+ on the right side and 2+ on the left side.       Bicep reflexes are 2+ on the right side and 2+ on the left side.       Brachioradialis reflexes are 2+ on the right side and 2+ on the left side.       Patellar reflexes are 2+ on the right side and 2+ on the left side.       Achilles reflexes are 2+ on the right side and 2+ on the left side.  Psychiatric: His speech is normal.     Neurologic Exam     Mental Status   Oriented to person, place, and time.   Registration of memory: Good recent and remote memory.   Attention: normal. Concentration: normal.   Speech: speech is normal   Level of consciousness: alert  Knowledge: consistent with education.     Cranial Nerves     CN II   Visual fields full to confrontation.   Visual acuity: normal    CN III, IV, VI   Pupils are equal, round, and reactive to light.  Extraocular motions are normal.     CN V   Facial sensation intact.   Right corneal reflex: normal  Left corneal reflex: normal    CN VII   Facial expression full, symmetric.   Right facial weakness: none  Left facial weakness: none    CN VIII   Hearing: intact    CN IX, X   Palate: symmetric    CN XI   Right sternocleidomastoid strength: normal  Left sternocleidomastoid strength: normal    CN XII   Tongue: not atrophic  Tongue deviation: none    Motor Exam   Muscle bulk: normal  Right arm tone: normal  Left arm tone: normal  Right leg tone: normal  Left leg tone: normal    Strength   Strength 5/5 except as noted.     Sensory Exam   Light touch normal.     Gait, Coordination, and Reflexes     Gait  Gait: normal    Coordination   Finger to nose  coordination: normal  Heel to shin coordination: normal    Reflexes   Right brachioradialis: 2+  Left brachioradialis: 2+  Right biceps: 2+  Left biceps: 2+  Right triceps: 2+  Left triceps: 2+  Right patellar: 2+  Left patellar: 2+  Right achilles: 2+  Left achilles: 2+  Right : 2+  Left : 2+      Assessment/Plan   Independent Review of Radiographic Studies:      I reviewed an MRI of his lumbar spine done in October of last year.  This does show a little narrowing and some disc bulging into the neural foramen on the right side at L2-3..  There is some disc bulging at L4 5 but no severe stenosis and all the other levels are fairly patent.    Medical Decision Making:      I told the patient that I had initially thought about doing another MRI however his symptoms are consistent with the MRI that was done last year.  So I suggested that we go ahead and try some more epidural blocks just to see if that will help things calm down.  He should stay on the physical therapy as well.  I will have him call me after the blocks and if he is no better we will talk about doing further more aggressive treatment    Raoul was seen today for back pain and leg pain.    Diagnoses and all orders for this visit:    Lumbar radiculopathy  -     Epidural Block    Return for Recheck and call after treatment or consultation.

## 2017-10-26 ENCOUNTER — HOSPITAL ENCOUNTER (OUTPATIENT)
Dept: GENERAL RADIOLOGY | Facility: HOSPITAL | Age: 72
Discharge: HOME | End: 2017-10-26

## 2017-10-26 ENCOUNTER — ANESTHESIA EVENT (OUTPATIENT)
Dept: PAIN MEDICINE | Facility: HOSPITAL | Age: 72
End: 2017-10-26

## 2017-10-26 ENCOUNTER — TRANSCRIBE ORDERS (OUTPATIENT)
Dept: PAIN MEDICINE | Facility: HOSPITAL | Age: 72
End: 2017-10-26

## 2017-10-26 ENCOUNTER — HOSPITAL ENCOUNTER (OUTPATIENT)
Dept: PAIN MEDICINE | Facility: HOSPITAL | Age: 72
Discharge: HOME | End: 2017-10-26
Admitting: NEUROLOGICAL SURGERY

## 2017-10-26 ENCOUNTER — ANESTHESIA (OUTPATIENT)
Dept: PAIN MEDICINE | Facility: HOSPITAL | Age: 72
End: 2017-10-26

## 2017-10-26 VITALS
HEIGHT: 70 IN | DIASTOLIC BLOOD PRESSURE: 75 MMHG | SYSTOLIC BLOOD PRESSURE: 121 MMHG | WEIGHT: 182 LBS | HEART RATE: 77 BPM | RESPIRATION RATE: 16 BRPM | OXYGEN SATURATION: 96 % | BODY MASS INDEX: 26.05 KG/M2 | TEMPERATURE: 97.8 F

## 2017-10-26 DIAGNOSIS — M54.16 LUMBAR RADICULOPATHY: ICD-10-CM

## 2017-10-26 DIAGNOSIS — R52 PAIN: ICD-10-CM

## 2017-10-26 DIAGNOSIS — M54.16 LUMBAR RADICULOPATHY: Primary | ICD-10-CM

## 2017-10-26 PROCEDURE — 25010000002 MIDAZOLAM PER 1 MG: Performed by: ANESTHESIOLOGY

## 2017-10-26 PROCEDURE — 25010000002 FENTANYL CITRATE (PF) 100 MCG/2ML SOLUTION: Performed by: ANESTHESIOLOGY

## 2017-10-26 PROCEDURE — 0 IOPAMIDOL 41 % SOLUTION: Performed by: ANESTHESIOLOGY

## 2017-10-26 PROCEDURE — C1755 CATHETER, INTRASPINAL: HCPCS

## 2017-10-26 PROCEDURE — 25010000002 METHYLPREDNISOLONE PER 80 MG: Performed by: ANESTHESIOLOGY

## 2017-10-26 PROCEDURE — 77003 FLUOROGUIDE FOR SPINE INJECT: CPT

## 2017-10-26 RX ORDER — SODIUM CHLORIDE 0.9 % (FLUSH) 0.9 %
1-10 SYRINGE (ML) INJECTION AS NEEDED
Status: DISCONTINUED | OUTPATIENT
Start: 2017-10-26 | End: 2017-10-27 | Stop reason: HOSPADM

## 2017-10-26 RX ORDER — MIDAZOLAM HYDROCHLORIDE 1 MG/ML
1 INJECTION INTRAMUSCULAR; INTRAVENOUS AS NEEDED
Status: DISCONTINUED | OUTPATIENT
Start: 2017-10-26 | End: 2017-10-27 | Stop reason: HOSPADM

## 2017-10-26 RX ORDER — LIDOCAINE HYDROCHLORIDE 10 MG/ML
0.5 INJECTION, SOLUTION INFILTRATION; PERINEURAL ONCE AS NEEDED
Status: CANCELLED | OUTPATIENT
Start: 2017-11-22

## 2017-10-26 RX ORDER — LIDOCAINE HYDROCHLORIDE 10 MG/ML
1 INJECTION, SOLUTION INFILTRATION; PERINEURAL ONCE AS NEEDED
Status: DISCONTINUED | OUTPATIENT
Start: 2017-10-26 | End: 2017-10-27 | Stop reason: HOSPADM

## 2017-10-26 RX ORDER — FENTANYL CITRATE 50 UG/ML
50 INJECTION, SOLUTION INTRAMUSCULAR; INTRAVENOUS AS NEEDED
Status: DISCONTINUED | OUTPATIENT
Start: 2017-10-26 | End: 2017-10-27 | Stop reason: HOSPADM

## 2017-10-26 RX ORDER — METHYLPREDNISOLONE ACETATE 80 MG/ML
80 INJECTION, SUSPENSION INTRA-ARTICULAR; INTRALESIONAL; INTRAMUSCULAR; SOFT TISSUE ONCE
Status: COMPLETED | OUTPATIENT
Start: 2017-10-26 | End: 2017-10-26

## 2017-10-26 RX ADMIN — MIDAZOLAM 2 MG: 1 INJECTION INTRAMUSCULAR; INTRAVENOUS at 09:07

## 2017-10-26 RX ADMIN — IOPAMIDOL 10 ML: 408 INJECTION, SOLUTION INTRATHECAL at 09:12

## 2017-10-26 RX ADMIN — METHYLPREDNISOLONE ACETATE 80 MG: 80 INJECTION, SUSPENSION INTRA-ARTICULAR; INTRALESIONAL; INTRAMUSCULAR; SOFT TISSUE at 09:13

## 2017-10-26 RX ADMIN — FENTANYL CITRATE 50 MCG: 50 INJECTION INTRAMUSCULAR; INTRAVENOUS at 09:07

## 2017-10-26 NOTE — H&P
[facility]    History and Physical    Patient Name: Raoul Moura  :  1945  MRN:  [id number]  Date of Admission: 10/26/2017    Subjective     Patient is a 72 y.o. male presents with chief complaint of chronic low back, hips: right and leg: right pain.  He had one epidural in November of last year for an L4-5 disc displacement with radiculopathy and did very well. He recently exacerbated his symptoms working on a leg machine at the gym and Dr Kirkpatrick suggested continuing with epidurals.    The following portions of the patients history were reviewed and updated as appropriate: current medications, allergies, past medical history, past surgical history, past family history, past social history and problem list                Objective     Past Medical History:   Past Medical History:   Diagnosis Date   • AAA (abdominal aortic aneurysm)     3cm, infrarenal, by CT    • Abnormal fasting glucose    • Blepharoptosis    • BPH associated with nocturia    • Cataract    • Colon polyp    • COPD (chronic obstructive pulmonary disease)    • Coronary artery calcification seen on CAT scan     CACS 2016: 146 (16 RCA, 129 LAD, 1 diag)   • Eczema    • ED (erectile dysfunction)    • Elevated homocysteine    • Hyperhidrosis    • Hyperlipidemia    • Male hypogonadism    • ANA ROSA on CPAP    • Peyronie disease    • PVC (premature ventricular contraction)    • Tobacco use    • UPJ (ureteropelvic junction) obstruction    • Vitamin B12 deficiency    • Vitamin D deficiency      Past Surgical History:   Past Surgical History:   Procedure Laterality Date   • CATARACT EXTRACTION Bilateral    • VASECTOMY       Family History: No family history on file.  Social History:   Social History   Substance Use Topics   • Smoking status: Smoker, Current Status Unknown     Packs/day: 0.25     Types: Cigarettes   • Smokeless tobacco: Never Used      Comment: SMOKES 10 CIGARETTES A DAY / CAFFEINE USE - ONE CUP   • Alcohol use 1.2 oz/week     2  "Shots of liquor per week      Comment: NIGHTLY       Vital Signs Range for the last 24 hours  Temperature: Temp:  [36.6 °C (97.8 °F)] 36.6 °C (97.8 °F)   Temp Source: Temp src: Oral   BP: BP: (132)/(77) 132/77   Pulse: Heart Rate:  [72] 72   Respirations: Resp:  [16] 16   SPO2: SpO2:  [100 %] 100 %   O2 Amount (l/min):     O2 Devices O2 Device: room air   Weight: Weight:  [182 lb (82.6 kg)] 182 lb (82.6 kg)     Flowsheet Rows         First Filed Value    Admission Height  70\" (177.8 cm) Documented at 10/26/2017 0849    Admission Weight  182 lb (82.6 kg) Documented at 10/26/2017 0849          --------------------------------------------------------------------------------    Current Outpatient Prescriptions   Medication Sig Dispense Refill   • rosuvastatin (CRESTOR) 10 MG tablet Take 10 mg by mouth daily.     • vitamin A 13182 UNIT capsule Take 10,000 Units by mouth Daily.     • vitamin B-12 (CYANOCOBALAMIN) 1000 MCG tablet Take 1,000 mcg by mouth Daily.       Current Facility-Administered Medications   Medication Dose Route Frequency Provider Last Rate Last Dose   • sodium chloride 0.9 % flush 1-10 mL  1-10 mL Intravenous PRN Oscar Chew MD           --------------------------------------------------------------------------------  Assessment/Plan      Anesthesia Evaluation            Airway   Mallampati: II  Dental - normal exam     Pulmonary     breath sounds clear to auscultation  Cardiovascular     Rate: normal        Neuro/Psych  GI/Hepatic/Renal/Endo      Musculoskeletal     (-) straight leg test  Abdominal    Substance History      OB/GYN          Other                                   Diagnosis and Plan    Treatment Plan  ASA 3      Procedures: Lumbar Epidural Steroid Injection(LESI), With fluoroscopy, With sedation      Anesthetic plan and risks discussed with patient.          Diagnosis     * Lumbar radiculopathy [M54.16]     * Lumbar degenerative disc disease [M51.36]                    "

## 2017-10-26 NOTE — ANESTHESIA PROCEDURE NOTES
PAIN Epidural block    Patient location during procedure: pain clinic  Indication:procedure for pain  Performed By  Anesthesiologist: RENETTA LEE  Preanesthetic Checklist  Completed: patient identified, site marked, surgical consent, pre-op evaluation, timeout performed, IV checked, risks and benefits discussed and monitors and equipment checked  Additional Notes  Performed under fluoroscopy  Post-Op Diagnosis Codes:     * Lumbar radiculopathy (M54.16)     * Lumbar degenerative disc disease (M51.36)  Prep:  Pt Position:prone  Sterile Tech:cap, gloves, mask and sterile barrier  Prep:chlorhexidine gluconate and isopropyl alcohol  Monitoring:blood pressure monitoring, continuous pulse oximetry and EKG  Procedure:  Approach:midline  Guidance: fluoroscopy  Location:lumbar  Level:4-5  Needle Gauge:20 G  Aspiration:negative  Test Dose:negative  Medications:  Depomedrol:80 mg  Preservative Free Saline:4mL  Isovue:3mL  Comments:Needle placement confirmed with epidural spread of contrast injection.  Post Assessment:  Post-procedure: Bandaid.  Pt Tolerance:patient tolerated the procedure well with no apparent complications  : wet tap on first approach right side of L4-5. Very shallow.

## 2017-10-31 ENCOUNTER — TREATMENT (OUTPATIENT)
Dept: PHYSICAL THERAPY | Facility: CLINIC | Age: 72
End: 2017-10-31

## 2017-10-31 DIAGNOSIS — G89.29 CHRONIC RIGHT-SIDED LOW BACK PAIN WITH RIGHT-SIDED SCIATICA: Primary | ICD-10-CM

## 2017-10-31 DIAGNOSIS — M54.41 CHRONIC RIGHT-SIDED LOW BACK PAIN WITH RIGHT-SIDED SCIATICA: Primary | ICD-10-CM

## 2017-10-31 PROCEDURE — DRYNDL PR CUSTOM DRY NEEDLING SELF PAY: Performed by: PHYSICAL THERAPIST

## 2017-10-31 NOTE — PROGRESS NOTES
Physical Therapy Daily Progress Note        Visit # : 27  Raoul Martell reports: I saw Dr. Kirkpatrick last Tues he ordered Epidural - had Epidural Thursday - felt good Thur night - started having pain again Friday and have had pain ever since - getting ready to leave on business trip and was hoping we could do some Dry Needling before I left     Subjective     Objective       Palpation     Right   Hypertonic in the gluteus ganesh and piriformis. Tenderness of the lumbar paraspinals and quadratus lumborum.      See Exercise, Manual, and Modality Logs for complete treatment.       Assessment & Plan     Assessment  Assessment details: Increased antalgic gait- decreased right LE stance time persists.  Lumbar shift left, increased muscle tone right PSIS and lumbar PVM's persists. Attempted Dry Needling followed by US and moist heat for inflammation.Will resume next week with traction. 2nd epidural scheduled for Nov 28th..         Progress strengthening /stabilization /functional activity           Manual Therapy:         mins  01034;  Therapeutic Exercise:         mins  60265;     Neuromuscular Rosanne:        mins  85130;    Therapeutic Activity:          mins  51837;     Gait Training:           mins  58324;     Ultrasound:          mins  23571;    Electrical Stimulation:         mins  36054 ( );  Dry Needling     20     mins self-pay    Timed Treatment:      mins   Total Treatment:     30   mins    Dianna Batres, ADIA  Physical Therapist  [place] License # [id number]

## 2017-11-07 ENCOUNTER — TREATMENT (OUTPATIENT)
Dept: PHYSICAL THERAPY | Facility: CLINIC | Age: 72
End: 2017-11-07

## 2017-11-07 DIAGNOSIS — G89.29 CHRONIC RIGHT-SIDED LOW BACK PAIN WITH RIGHT-SIDED SCIATICA: Primary | ICD-10-CM

## 2017-11-07 DIAGNOSIS — M54.41 CHRONIC RIGHT-SIDED LOW BACK PAIN WITH RIGHT-SIDED SCIATICA: Primary | ICD-10-CM

## 2017-11-07 PROCEDURE — 97012 MECHANICAL TRACTION THERAPY: CPT | Performed by: PHYSICAL THERAPIST

## 2017-11-07 PROCEDURE — DRYNDL PR CUSTOM DRY NEEDLING SELF PAY: Performed by: PHYSICAL THERAPIST

## 2017-11-07 PROCEDURE — 97110 THERAPEUTIC EXERCISES: CPT | Performed by: PHYSICAL THERAPIST

## 2017-11-08 ENCOUNTER — TREATMENT (OUTPATIENT)
Dept: PHYSICAL THERAPY | Facility: CLINIC | Age: 72
End: 2017-11-08

## 2017-11-08 DIAGNOSIS — G89.29 CHRONIC RIGHT-SIDED LOW BACK PAIN WITH RIGHT-SIDED SCIATICA: Primary | ICD-10-CM

## 2017-11-08 DIAGNOSIS — M54.41 CHRONIC RIGHT-SIDED LOW BACK PAIN WITH RIGHT-SIDED SCIATICA: Primary | ICD-10-CM

## 2017-11-08 PROCEDURE — 97035 APP MDLTY 1+ULTRASOUND EA 15: CPT | Performed by: PHYSICAL THERAPIST

## 2017-11-08 PROCEDURE — 97012 MECHANICAL TRACTION THERAPY: CPT | Performed by: PHYSICAL THERAPIST

## 2017-11-08 PROCEDURE — DRYNDL PR CUSTOM DRY NEEDLING SELF PAY: Performed by: PHYSICAL THERAPIST

## 2017-11-08 NOTE — PROGRESS NOTES
Physical Therapy Daily Progress Note      Visit # : 29  Raoul Moura reports: I had less pain into my hip/ thigh after last visit     Subjective     Objective       Palpation     Right   Hypertonic in the erector spinae, gluteus ganesh, lumbar paraspinals, piriformis and quadratus lumborum.      See Exercise, Manual, and Modality Logs for complete treatment.       Assessment & Plan     Assessment  Assessment details: Increased left lumbar shift noted today, increased muscle tone right PSIS and lumbar PVM's persists. Improved subjective reports and reduced right LE radicular s/s. Attempted Dry Needling followed by US for inflammation and traction for reduction of radicular s/s. Patient has not noticed significant improvement after initial epidural.         Progress strengthening /stabilization /functional activity           Manual Therapy:         mins  43669;  Therapeutic Exercise:         mins  66054;     Neuromuscular Rosanne:        mins  02242;    Therapeutic Activity:          mins  93862;     Gait Training:           mins  96620;     Ultrasound:     8     mins  45583;    Electrical Stimulation:         mins  87193 ( );  Dry Needling     15     mins self-pay    Timed Treatment:   8   mins   Total Treatment:     38   mins    Dianna Batres PT  Physical Therapist  [place] License # [id number]

## 2017-11-13 ENCOUNTER — TREATMENT (OUTPATIENT)
Dept: PHYSICAL THERAPY | Facility: CLINIC | Age: 72
End: 2017-11-13

## 2017-11-13 DIAGNOSIS — M54.41 CHRONIC RIGHT-SIDED LOW BACK PAIN WITH RIGHT-SIDED SCIATICA: Primary | ICD-10-CM

## 2017-11-13 DIAGNOSIS — G89.29 CHRONIC RIGHT-SIDED LOW BACK PAIN WITH RIGHT-SIDED SCIATICA: Primary | ICD-10-CM

## 2017-11-13 PROCEDURE — 97035 APP MDLTY 1+ULTRASOUND EA 15: CPT | Performed by: PHYSICAL THERAPIST

## 2017-11-13 PROCEDURE — 97012 MECHANICAL TRACTION THERAPY: CPT | Performed by: PHYSICAL THERAPIST

## 2017-11-13 PROCEDURE — DRYNDL PR CUSTOM DRY NEEDLING SELF PAY: Performed by: PHYSICAL THERAPIST

## 2017-11-13 NOTE — PROGRESS NOTES
Physical Therapy Daily Progress Note        Visit # : 30  Raoul Moura reports: Saturday and Sunday the pain was not horrible today is a little worse again     Subjective     Objective       Postural Observations  Standing posture: fair    Additional Postural Observation Details  Left lumbar shift in standing - improved antalgic gait      See Exercise, Manual, and Modality Logs for complete treatment.       Assessment & Plan     Assessment  Assessment details: Improved antalgic gait - left lumbar shift persists. Patient started taking Ibuprofen over the weekend and seems to have some improvement. Improved subjective reports after Dry Needling and Traction. Increased muscle tone right lumbar PVM's persists. Cont PT 1-2x per week for modalities to decrease right LE radicular s/s.         Progress strengthening /stabilization /functional activity           Manual Therapy:         mins  45622;  Therapeutic Exercise:         mins  37570;     Neuromuscular Rosanne:        mins  44075;    Therapeutic Activity:          mins  34922;     Gait Training:           mins  76280;     Ultrasound:     8     mins  61698;    Electrical Stimulation:         mins  15585 ( );  Dry Needling     20     mins self-pay    Timed Treatment:   8   mins   Total Treatment:     43   mins    Dianna Batres PT  Physical Therapist  [place] License # [id number]

## 2017-11-15 ENCOUNTER — TREATMENT (OUTPATIENT)
Dept: PHYSICAL THERAPY | Facility: CLINIC | Age: 72
End: 2017-11-15

## 2017-11-15 DIAGNOSIS — M54.16 LUMBAR RADICULOPATHY: Primary | ICD-10-CM

## 2017-11-15 PROCEDURE — DRYNDL PR CUSTOM DRY NEEDLING SELF PAY: Performed by: PHYSICAL THERAPIST

## 2017-11-15 PROCEDURE — 97110 THERAPEUTIC EXERCISES: CPT | Performed by: PHYSICAL THERAPIST

## 2017-11-15 PROCEDURE — 97012 MECHANICAL TRACTION THERAPY: CPT | Performed by: PHYSICAL THERAPIST

## 2017-11-15 NOTE — PROGRESS NOTES
Physical Therapy Daily Progress Note        Visit # : 31  Raoul Moura reports: I think I am walking a little better today - pain feels a little better overall - still taking advil every 4 hours     Subjective     Objective       Palpation     Additional Palpation Details  Improved muscle tone right lumbar PVM's     See Exercise, Manual, and Modality Logs for complete treatment.       Assessment & Plan     Assessment  Assessment details: Improved antalgic gait - left lumbar shift persists. Improvement persists with Advil. Improved subjective reports after Dry Needling and Traction. Increased muscle tone right lumbar PVM's persists. Cont PT 1-2x per week for modalities to decrease right LE radicular s/s.         Progress strengthening /stabilization /functional activity           Manual Therapy:         mins  41895;  Therapeutic Exercise:    8     mins  56364;     Neuromuscular Rosanne:        mins  17989;    Therapeutic Activity:          mins  00257;     Gait Training:          mins  73295;     Ultrasound:     8     mins  73448;    Electrical Stimulation:         mins  12474 ( );  Dry Needling     15     mins self-pay    Timed Treatment:   16   mins   Total Treatment:     50   mins    Dianna Batres, PT  Physical Therapist  [place] License # [id number]

## 2017-11-17 ENCOUNTER — TREATMENT (OUTPATIENT)
Dept: PHYSICAL THERAPY | Facility: CLINIC | Age: 72
End: 2017-11-17

## 2017-11-17 DIAGNOSIS — M54.16 LUMBAR RADICULOPATHY: Primary | ICD-10-CM

## 2017-11-17 PROCEDURE — DRYNDL PR CUSTOM DRY NEEDLING SELF PAY: Performed by: PHYSICAL THERAPIST

## 2017-11-17 PROCEDURE — 97035 APP MDLTY 1+ULTRASOUND EA 15: CPT | Performed by: PHYSICAL THERAPIST

## 2017-11-17 PROCEDURE — 97012 MECHANICAL TRACTION THERAPY: CPT | Performed by: PHYSICAL THERAPIST

## 2017-11-17 NOTE — PROGRESS NOTES
Physical Therapy Daily Progress Note      Visit # : 32  Raoul Moura reports: I am feeling a little better - epidural scheduled one week from Monday     Subjective     Objective       Postural Observations    Additional Postural Observation Details  Improved left lateral shift     Ambulation     Observational Gait   Gait: antalgic   Decreased walking speed, right stance time and right step length.      See Exercise, Manual, and Modality Logs for complete treatment.       Assessment & Plan     Assessment  Assessment details: Improved  left lumbar shift - antalgic gait persists. Improved subjective reports after Dry Needling and Traction. Increased muscle tone right lumbar PVM's persists. Cont PT 1-2x per week for modalities to decrease right LE radicular s/s.         Progress strengthening /stabilization /functional activity           Manual Therapy:         mins  44725;  Therapeutic Exercise:         mins  51697;     Neuromuscular Rosanne:        mins  45258;    Therapeutic Activity:          mins  36514;     Gait Training:           mins  23171;     Ultrasound:     8     mins  50117;    Electrical Stimulation:         mins  52255 ( );  Dry Needling     15     mins self-pay    Timed Treatment:   8   mins   Total Treatment:     38   mins    Dianna Batres, PT  Physical Therapist  [place] License # [id number]

## 2017-11-20 ENCOUNTER — TREATMENT (OUTPATIENT)
Dept: PHYSICAL THERAPY | Facility: CLINIC | Age: 72
End: 2017-11-20

## 2017-11-20 DIAGNOSIS — M54.16 LUMBAR RADICULOPATHY: Primary | ICD-10-CM

## 2017-11-20 PROCEDURE — 97140 MANUAL THERAPY 1/> REGIONS: CPT | Performed by: PHYSICAL THERAPIST

## 2017-11-20 PROCEDURE — DRYNDL PR CUSTOM DRY NEEDLING SELF PAY: Performed by: PHYSICAL THERAPIST

## 2017-11-20 PROCEDURE — 97012 MECHANICAL TRACTION THERAPY: CPT | Performed by: PHYSICAL THERAPIST

## 2017-11-20 NOTE — PROGRESS NOTES
Physical Therapy Daily Progress Note        Visit # : 33  Raoul Muora reports:  I did very little over the weekend - my daughter said I look out of alignment     Subjective     Objective       Static Posture     Lumbar Spine   Lumbar spine (Left): Shifted.     Comments  Right Inflare and PINN prior to treatment able to reduce with manual muscle energy     Tests     Lumbar     Right   Positive crossed SLR, femoral stretch and lumbar push.     Right Pelvic Girdle/Sacrum   Negative: sacrum compression and sacral spring.     Right Hip   Positive scour.   Negative LAVINIA and piriformis.      See Exercise, Manual, and Modality Logs for complete treatment.       Assessment & Plan     Assessment  Assessment details: Increaesd left lumbar shift and antalgic gait noted today. Patient presented with right Inflare and PINN prior to treatment able to reduce with manual muscle energy.  Improved subjective reports after Dry Needling and Traction. Increased muscle tone right lumbar PVM's persists. Cont PT 1-2x per week for modalities to decrease right LE radicular s/s.         Progress strengthening /stabilization /functional activity           Manual Therapy:    8    mins  35671;  Therapeutic Exercise:         mins  75906;     Neuromuscular Rosanne:        mins  00228;    Therapeutic Activity:          mins  68748;     Gait Training:           mins  78289;     Ultrasound:     8     mins  47926;    Electrical Stimulation:         mins  42234 ( );  Dry Needling     24     mins self-pay    Timed Treatment:     mins   Total Treatment:     55   mins    Dianna Batres, PT  Physical Therapist  [place] License # [id number]

## 2017-11-22 ENCOUNTER — TREATMENT (OUTPATIENT)
Dept: PHYSICAL THERAPY | Facility: CLINIC | Age: 72
End: 2017-11-22

## 2017-11-22 DIAGNOSIS — M54.16 LUMBAR RADICULOPATHY: Primary | ICD-10-CM

## 2017-11-22 PROCEDURE — 97012 MECHANICAL TRACTION THERAPY: CPT | Performed by: PHYSICAL THERAPIST

## 2017-11-22 PROCEDURE — DRYNDL PR CUSTOM DRY NEEDLING SELF PAY: Performed by: PHYSICAL THERAPIST

## 2017-11-22 PROCEDURE — 97035 APP MDLTY 1+ULTRASOUND EA 15: CPT | Performed by: PHYSICAL THERAPIST

## 2017-11-22 NOTE — PROGRESS NOTES
Physical Therapy Daily Progress Note        Visit # : 34  Raoul Moura reports: Think I am a little better today     Subjective     Objective       Postural Observations    Additional Postural Observation Details  Slight improvement in left lumbar shift - antalgic gait persists decreased right LE stance time and heel strike      See Exercise, Manual, and Modality Logs for complete treatment.       Assessment & Plan     Assessment  Assessment details: Slight improvement in left lumbar shift and antalgic gait noted today.  Improved subjective reports after Dry Needling and Traction. Increased muscle tone right lumbar PVM's persists. Cont PT 1-2x per week for modalities to decrease right LE radicular s/s.         Progress strengthening /stabilization /functional activity           Manual Therapy:         mins  84012;  Therapeutic Exercise:         mins  00385;     Neuromuscular Rosanne:        mins  42419;    Therapeutic Activity:          mins  38943;     Gait Training:           mins  60048;     Ultrasound:     8     mins  16466;    Electrical Stimulation:         mins  28829 ( );  Dry Needling     15     mins self-pay    Timed Treatment:   8   mins   Total Treatment:     45   mins    Dianna Batres PT  Physical Therapist  [place] License # [id number]

## 2017-11-27 ENCOUNTER — HOSPITAL ENCOUNTER (OUTPATIENT)
Dept: PAIN MEDICINE | Facility: HOSPITAL | Age: 72
Discharge: HOME | End: 2017-11-27
Attending: NEUROLOGICAL SURGERY | Admitting: NEUROLOGICAL SURGERY

## 2017-11-27 ENCOUNTER — ANESTHESIA EVENT (OUTPATIENT)
Dept: PAIN MEDICINE | Facility: HOSPITAL | Age: 72
End: 2017-11-27

## 2017-11-27 ENCOUNTER — ANESTHESIA (OUTPATIENT)
Dept: PAIN MEDICINE | Facility: HOSPITAL | Age: 72
End: 2017-11-27

## 2017-11-27 ENCOUNTER — HOSPITAL ENCOUNTER (OUTPATIENT)
Dept: GENERAL RADIOLOGY | Facility: HOSPITAL | Age: 72
Discharge: HOME | End: 2017-11-27

## 2017-11-27 VITALS
HEART RATE: 79 BPM | SYSTOLIC BLOOD PRESSURE: 111 MMHG | DIASTOLIC BLOOD PRESSURE: 63 MMHG | RESPIRATION RATE: 16 BRPM | TEMPERATURE: 98.1 F | OXYGEN SATURATION: 95 %

## 2017-11-27 DIAGNOSIS — R52 PAIN: ICD-10-CM

## 2017-11-27 DIAGNOSIS — M54.16 LUMBAR RADICULOPATHY: ICD-10-CM

## 2017-11-27 PROCEDURE — 0 IOPAMIDOL 41 % SOLUTION: Performed by: ANESTHESIOLOGY

## 2017-11-27 PROCEDURE — 25010000002 MIDAZOLAM PER 1 MG: Performed by: ANESTHESIOLOGY

## 2017-11-27 PROCEDURE — C1755 CATHETER, INTRASPINAL: HCPCS

## 2017-11-27 PROCEDURE — 25010000002 METHYLPREDNISOLONE PER 80 MG: Performed by: ANESTHESIOLOGY

## 2017-11-27 PROCEDURE — 77003 FLUOROGUIDE FOR SPINE INJECT: CPT

## 2017-11-27 PROCEDURE — 25010000002 FENTANYL CITRATE (PF) 100 MCG/2ML SOLUTION: Performed by: ANESTHESIOLOGY

## 2017-11-27 RX ORDER — SODIUM CHLORIDE 0.9 % (FLUSH) 0.9 %
1-10 SYRINGE (ML) INJECTION AS NEEDED
Status: DISCONTINUED | OUTPATIENT
Start: 2017-11-27 | End: 2017-11-28 | Stop reason: HOSPADM

## 2017-11-27 RX ORDER — METHYLPREDNISOLONE ACETATE 80 MG/ML
80 INJECTION, SUSPENSION INTRA-ARTICULAR; INTRALESIONAL; INTRAMUSCULAR; SOFT TISSUE ONCE
Status: COMPLETED | OUTPATIENT
Start: 2017-11-27 | End: 2017-11-27

## 2017-11-27 RX ORDER — MIDAZOLAM HYDROCHLORIDE 1 MG/ML
1 INJECTION INTRAMUSCULAR; INTRAVENOUS
Status: DISCONTINUED | OUTPATIENT
Start: 2017-11-27 | End: 2017-11-28 | Stop reason: HOSPADM

## 2017-11-27 RX ORDER — LIDOCAINE HYDROCHLORIDE 10 MG/ML
1 INJECTION, SOLUTION INFILTRATION; PERINEURAL ONCE
Status: DISCONTINUED | OUTPATIENT
Start: 2017-11-27 | End: 2017-11-28 | Stop reason: HOSPADM

## 2017-11-27 RX ORDER — FENTANYL CITRATE 50 UG/ML
50 INJECTION, SOLUTION INTRAMUSCULAR; INTRAVENOUS
Status: DISCONTINUED | OUTPATIENT
Start: 2017-11-27 | End: 2017-11-28 | Stop reason: HOSPADM

## 2017-11-27 RX ADMIN — METHYLPREDNISOLONE ACETATE 80 MG: 80 INJECTION, SUSPENSION INTRA-ARTICULAR; INTRALESIONAL; INTRAMUSCULAR; SOFT TISSUE at 12:53

## 2017-11-27 RX ADMIN — IOPAMIDOL 10 ML: 408 INJECTION, SOLUTION INTRATHECAL at 12:53

## 2017-11-27 RX ADMIN — MIDAZOLAM: 1 INJECTION INTRAMUSCULAR; INTRAVENOUS at 12:47

## 2017-11-27 RX ADMIN — FENTANYL CITRATE 50 MCG: 50 INJECTION INTRAMUSCULAR; INTRAVENOUS at 12:47

## 2017-11-27 NOTE — ANESTHESIA PROCEDURE NOTES
PAIN Epidural block    Patient location during procedure: pain clinic  Start Time: 11/27/2017 12:43 PM  Stop Time: 11/27/2017 12:54 PM  Indication:procedure for pain  Performed By  Anesthesiologist: ALEENA ARITA  Preanesthetic Checklist  Completed: patient identified, site marked, surgical consent, pre-op evaluation, timeout performed, risks and benefits discussed and monitors and equipment checked  Additional Notes  Depomedrol - 80mg    Needle position confirmed by fluoroscopy and epidurogram using 2cc of [id number].    Diagnosis  Post-Op Diagnosis Codes:     * Lumbar degenerative disc disease (M51.36)     * Lumbar radiculopathy (M54.16)    Prep:  Pt Position:prone  Sterile Tech:cap, gloves, mask and sterile barrier  Prep:chlorhexidine gluconate and isopropyl alcohol  Monitoring:blood pressure monitoring, continuous pulse oximetry and EKG  Procedure:  Sedation: no   Approach:right paramedian  Guidance: fluoroscopy  Location:lumbar  Level:4-5  Needle Type:Tuohy  Needle Gauge:20  Aspiration:negative  Medications:  Depomedrol:80 mg  Preservative Free Saline:2mL  Isovue:2mL    Post Assessment:  Post-procedure: bandaide.  Pt Tolerance:patient tolerated the procedure well with no apparent complications  Complications:no

## 2017-11-27 NOTE — H&P
[facility]    History and Physical    Patient Name: Raoul Moura  :  1945  MRN:  [id number]  Date of Admission: 2017    Subjective     Patient is a 72 y.o. male presents with chief complaint of acute, intermitent, moderate low back and hips: right pain.  Onset of symptoms was gradual starting a few months ago.  Symptoms are associated/aggravated by standing, straining or twisting. Symptoms improve with relaxation    The following portions of the patients history were reviewed and updated as appropriate: current medications, allergies, past medical history, past surgical history, past family history, past social history and problem list        Objective     Past Medical History:   Past Medical History:   Diagnosis Date   • AAA (abdominal aortic aneurysm)     3cm, infrarenal, by CT    • Abnormal fasting glucose    • Blepharoptosis    • BPH associated with nocturia    • Cataract    • Colon polyp    • COPD (chronic obstructive pulmonary disease)    • Coronary artery calcification seen on CAT scan     CACS 2016: 146 (16 RCA, 129 LAD, 1 diag)   • Eczema    • ED (erectile dysfunction)    • Elevated homocysteine    • Hyperhidrosis    • Hyperlipidemia    • Male hypogonadism    • ANA ROSA on CPAP    • Peyronie disease    • PVC (premature ventricular contraction)    • Tobacco use    • UPJ (ureteropelvic junction) obstruction    • Vitamin B12 deficiency    • Vitamin D deficiency      Past Surgical History:   Past Surgical History:   Procedure Laterality Date   • CATARACT EXTRACTION Bilateral    • EPIDURAL BLOCK     • VASECTOMY       Family History: No family history on file.  Social History:   Social History   Substance Use Topics   • Smoking status: Smoker, Current Status Unknown     Packs/day: 0.25     Types: Cigarettes   • Smokeless tobacco: Never Used      Comment: SMOKES 10 CIGARETTES A DAY / CAFFEINE USE - ONE CUP   • Alcohol use 1.2 oz/week     2 Shots of liquor per week      Comment: NIGHTLY        Vital Signs Range for the last 24 hours  Temperature: Temp:  [36.7 °C (98.1 °F)] 36.7 °C (98.1 °F)   Temp Source: Temp src: Oral   BP: BP: (133)/(90) 133/90   Pulse: Heart Rate:  [80] 80   Respirations: Resp:  [16] 16   SPO2: SpO2:  [96 %] 96 %   O2 Amount (l/min):     O2 Devices O2 Device: room air   Weight:           --------------------------------------------------------------------------------    Current Outpatient Prescriptions   Medication Sig Dispense Refill   • rosuvastatin (CRESTOR) 10 MG tablet Take 10 mg by mouth daily.     • vitamin A 48770 UNIT capsule Take 10,000 Units by mouth Daily.     • vitamin B-12 (CYANOCOBALAMIN) 1000 MCG tablet Take 1,000 mcg by mouth Daily.       Current Facility-Administered Medications   Medication Dose Route Frequency Provider Last Rate Last Dose   • sodium chloride 0.9 % flush 1-10 mL  1-10 mL Intravenous PRN Dick Lucero MD           --------------------------------------------------------------------------------  Assessment/Plan      Anesthesia Evaluation     Patient summary reviewed and Nursing notes reviewed   no history of anesthetic complications:         Airway   Mallampati: II  TM distance: >3 FB  Neck ROM: full  Dental      Pulmonary - normal exam   (+) a smoker Current, COPD, sleep apnea,   Cardiovascular - normal exam    (+) CAD, PVD, hyperlipidemia      Neuro/Psych  (+) numbness,    GI/Hepatic/Renal/Endo      Musculoskeletal     (+) Straight Leg Test,   Abdominal    Substance History      OB/GYN          Other                                   Diagnosis and Plan      Treatment Plan  ASA 3      Procedures: Lumbar Epidural Steroid Injection(LESI), With fluoroscopy,       Anesthetic plan and risks discussed with patient.      Diagnosis -- lumbar radiculopathy, lumbar DDD    Plan includes:  1. Epidural steroid injections, up to 3, spaced 1-2 weeks apart. If pain control is acceptable after 1 or 2 injections, it was discussed with the patient that  they may return for the subsequent injections if and when their pain returns. The risks were discussed with the patient including failure of relief, worsening pain, Headache (post dural puncture headache), bleeding (epidural hematoma) and infection (epidural abscess or skin infection).  2. Physical therapy exercises at home as prescribed by physical therapy or from the pain clinic handout (given to the patient). Continuation of these exercises every day, or multiple times per week, even when the patient has good pain relief, was stressed to the patient as a preventative measure to decrease the frequency and severity of future pain episodes.  3. Continue pain medicines as already prescribed. If patient not currently taking any, it is recommended to begin Acetaminophen 1000 mg po q 8 hours. If other medicines containing Acetaminophen are currently prescribed, maintain daily dose at 3000 mg.   4. If they can tolerate NSAIDS, it is recommended to take Ibuprofen 600 mg po q 6 hours for 7 days during pain exacerbations. Alternatively, they may substitute an NSAID of their choice (e.g. Aleve). This may be taken at the same time as Acetaminophen.  5. Heat and ice to the affected area as tolerated for pain control. It was discussed that heating pads can cause burns.  6. Daily low impact exercise such as walking or water exercise was recommended to maintain overall health and aid in weight control.   7. Follow up as needed for subsequent injections.  8. Patient was counseled to abstain from tobacco products.

## 2017-12-04 ENCOUNTER — TREATMENT (OUTPATIENT)
Dept: PHYSICAL THERAPY | Facility: CLINIC | Age: 72
End: 2017-12-04

## 2017-12-04 DIAGNOSIS — M54.16 LUMBAR RADICULOPATHY: Primary | ICD-10-CM

## 2017-12-04 PROCEDURE — 97035 APP MDLTY 1+ULTRASOUND EA 15: CPT | Performed by: PHYSICAL THERAPIST

## 2017-12-04 PROCEDURE — 97012 MECHANICAL TRACTION THERAPY: CPT | Performed by: PHYSICAL THERAPIST

## 2017-12-04 PROCEDURE — DRYNDL PR CUSTOM DRY NEEDLING SELF PAY: Performed by: PHYSICAL THERAPIST

## 2017-12-04 NOTE — PROGRESS NOTES
Physical Therapy Daily Progress Note        Visit # : 35  Raoul Moura reports: I had the epidural a week ago today - have noticed maybe a little improvement - overall still having a lot of pain into my right leg     Subjective     Objective       Postural Observations  Standing posture: fair    Additional Postural Observation Details  Slight improvement in left lateral shift      See Exercise, Manual, and Modality Logs for complete treatment.       Assessment & Plan     Assessment  Assessment details: Slight improvement in left lumbar shift and antalgic gait noted today.  Improved subjective reports after Dry Needling and Traction. Increased muscle tone right lumbar PVM's persists. Cont PT 1-2x per week for modalities to decrease right LE radicular s/s.         Progress per Plan of Care           Manual Therapy:         mins  86832;  Therapeutic Exercise:         mins  68522;     Neuromuscular Rosanne:        mins  86952;    Therapeutic Activity:          mins  15101;     Gait Training:           mins  24409;     Ultrasound:     8     mins  98529;    Electrical Stimulation:         mins  79679 ( );  Dry Needling     15     mins self-pay    Timed Treatment:   8   mins   Total Treatment:     38   mins    Dianna Batres, PT  Physical Therapist  [place] License # [id number]

## 2017-12-07 ENCOUNTER — TELEPHONE (OUTPATIENT)
Dept: NEUROSURGERY | Facility: CLINIC | Age: 72
End: 2017-12-07

## 2017-12-07 ENCOUNTER — TREATMENT (OUTPATIENT)
Dept: PHYSICAL THERAPY | Facility: CLINIC | Age: 72
End: 2017-12-07

## 2017-12-07 DIAGNOSIS — M54.16 LUMBAR RADICULOPATHY: Primary | ICD-10-CM

## 2017-12-07 PROCEDURE — 97012 MECHANICAL TRACTION THERAPY: CPT | Performed by: PHYSICAL THERAPIST

## 2017-12-07 PROCEDURE — 97035 APP MDLTY 1+ULTRASOUND EA 15: CPT | Performed by: PHYSICAL THERAPIST

## 2017-12-07 PROCEDURE — DRYNDL PR CUSTOM DRY NEEDLING SELF PAY: Performed by: PHYSICAL THERAPIST

## 2017-12-07 NOTE — TELEPHONE ENCOUNTER
Have him stop the therapy and get an MRI done without contrast of his lumbar spine now.  Leg me know when it has been done so I can look at it and see if we need to move up his appointment.

## 2017-12-07 NOTE — TELEPHONE ENCOUNTER
Patient called today and states he has had 2 AALIYAH and is doing physical therapy. He is having increased pain in his back and down his right leg. He is scheduled for an appointment on 1/2/2018 with you and wants to know if he should get an MRI prior to his appointment. He states he is having to walk around with a cane now.

## 2017-12-07 NOTE — TELEPHONE ENCOUNTER
Patient is aware and he will have the test at [facility]. He will then call once it is complete.

## 2017-12-07 NOTE — PROGRESS NOTES
Physical Therapy Daily Progress Note        Visit # : 36  Raoul Moura reports: I felt okay when I left here Monday had to sit in a meeting yesterday and I have had increased pain ever since     Subjective     Objective       Ambulation     Observational Gait   Gait: antalgic   Decreased walking speed, stride length and right stance time.   Right foot contact pattern: foot flat    Additional Observational Gait Details  Ambulating with cane - decreased tolerance to weight-bearing through R LE     See Exercise, Manual, and Modality Logs for complete treatment.       Assessment & Plan     Assessment  Assessment details: Significant increase in radicular s/s and subjective reports.  No significant improvement in subjective reports after Dry Needling and Traction. Increased muscle tone right lumbar PVM's persists. Cont PT 1-2x per week for modalities to decrease right LE radicular s/s.         Progress per Plan of Care           Manual Therapy:         mins  71178;  Therapeutic Exercise:         mins  02308;     Neuromuscular Rosanne:        mins  28020;    Therapeutic Activity:          mins  70639;     Gait Training:           mins  84894;     Ultrasound:     8     mins  24490;    Electrical Stimulation:         mins  44874 ( );  Dry Needling     15     mins self-pay    Timed Treatment:   8   mins   Total Treatment:     38   mins    Dianna Batres, ADIA  Physical Therapist  [place] License # [id number]

## 2017-12-12 ENCOUNTER — HOSPITAL ENCOUNTER (OUTPATIENT)
Dept: MRI IMAGING | Facility: HOSPITAL | Age: 72
Discharge: HOME | End: 2017-12-12
Admitting: NEUROLOGICAL SURGERY

## 2017-12-12 ENCOUNTER — HOSPITAL ENCOUNTER (OUTPATIENT)
Dept: MRI IMAGING | Facility: HOSPITAL | Age: 72
End: 2017-12-12

## 2017-12-12 ENCOUNTER — HOSPITAL ENCOUNTER (OUTPATIENT)
Dept: CT IMAGING | Facility: HOSPITAL | Age: 72
Discharge: HOME | End: 2017-12-12

## 2017-12-12 ENCOUNTER — TELEPHONE (OUTPATIENT)
Dept: NEUROSURGERY | Facility: CLINIC | Age: 72
End: 2017-12-12

## 2017-12-12 DIAGNOSIS — M54.16 LUMBAR RADICULOPATHY: ICD-10-CM

## 2017-12-12 DIAGNOSIS — M54.16 LUMBAR RADICULOPATHY: Primary | ICD-10-CM

## 2017-12-12 PROCEDURE — 0 IOPAMIDOL PER 1 ML: Performed by: NEUROLOGICAL SURGERY

## 2017-12-12 PROCEDURE — 72148 MRI LUMBAR SPINE W/O DYE: CPT

## 2017-12-12 PROCEDURE — 74177 CT ABD & PELVIS W/CONTRAST: CPT

## 2017-12-12 RX ADMIN — IOPAMIDOL 100 ML: 755 INJECTION, SOLUTION INTRAVENOUS at 17:15

## 2017-12-12 NOTE — TELEPHONE ENCOUNTER
I talked to the radiologist about this patient's MRI today.  The MRI of his lumbar spine is fairly unremarkable but is some concern about a retroperitoneal hematoma on the right side seen on the edge of the film.  I called him on the phone to give him his report and told him we will get him set up for a CT scan of his abdomen and pelvis to be done today.

## 2017-12-26 ENCOUNTER — APPOINTMENT (OUTPATIENT)
Dept: PAIN MEDICINE | Facility: HOSPITAL | Age: 72
End: 2017-12-26